# Patient Record
Sex: MALE | Race: WHITE | Employment: FULL TIME | URBAN - METROPOLITAN AREA
[De-identification: names, ages, dates, MRNs, and addresses within clinical notes are randomized per-mention and may not be internally consistent; named-entity substitution may affect disease eponyms.]

---

## 2017-02-06 ENCOUNTER — OFFICE VISIT (OUTPATIENT)
Dept: FAMILY MEDICINE CLINIC | Facility: CLINIC | Age: 32
End: 2017-02-06

## 2017-02-06 VITALS
SYSTOLIC BLOOD PRESSURE: 130 MMHG | DIASTOLIC BLOOD PRESSURE: 82 MMHG | OXYGEN SATURATION: 98 % | HEART RATE: 52 BPM | BODY MASS INDEX: 31 KG/M2 | TEMPERATURE: 98 F | WEIGHT: 222 LBS

## 2017-02-06 DIAGNOSIS — R42 EPISODE OF DIZZINESS: ICD-10-CM

## 2017-02-06 DIAGNOSIS — R00.1 BRADYCARDIA: Primary | ICD-10-CM

## 2017-02-06 DIAGNOSIS — H92.02 EAR PAIN, LEFT: ICD-10-CM

## 2017-02-06 NOTE — PROGRESS NOTES
CHIEF COMPLAINT:   Patient presents with:  Ear Pain      HPI:   Cristy Anne is a 33year old male who presents to clinic today with complaints of left ear pain. Has had for 2  days.  Pain is described as achy and is located at left ear and going down landmarks visible. Left TM: with mild erythema around TM with opaque TM. Small piece of cerumen near TM.   NOSE: nostrils patent, no exudates, nasal mucosa pink and noninflamed  THROAT: oral mucosa pink, moist. Posterior pharynx is not erythematous or inje

## 2017-02-07 ENCOUNTER — HOSPITAL ENCOUNTER (OUTPATIENT)
Age: 32
Discharge: HOME | End: 2017-02-07
Attending: EMERGENCY MEDICINE
Payer: COMMERCIAL

## 2017-02-07 VITALS
BODY MASS INDEX: 30.94 KG/M2 | HEIGHT: 71 IN | RESPIRATION RATE: 16 BRPM | TEMPERATURE: 98 F | SYSTOLIC BLOOD PRESSURE: 135 MMHG | DIASTOLIC BLOOD PRESSURE: 84 MMHG | HEART RATE: 60 BPM | OXYGEN SATURATION: 97 % | WEIGHT: 221 LBS

## 2017-02-07 DIAGNOSIS — H66.002 ACUTE SUPPURATIVE OTITIS MEDIA OF LEFT EAR WITHOUT SPONTANEOUS RUPTURE OF TYMPANIC MEMBRANE, RECURRENCE NOT SPECIFIED: Primary | ICD-10-CM

## 2017-02-07 PROCEDURE — 99204 OFFICE O/P NEW MOD 45 MIN: CPT

## 2017-02-07 PROCEDURE — 99213 OFFICE O/P EST LOW 20 MIN: CPT

## 2017-02-07 RX ORDER — AMOXICILLIN 500 MG/1
500 TABLET, FILM COATED ORAL 3 TIMES DAILY
Qty: 30 TABLET | Refills: 0 | Status: SHIPPED | OUTPATIENT
Start: 2017-02-07 | End: 2017-02-17

## 2017-02-07 NOTE — ED INITIAL ASSESSMENT (HPI)
Pt states that he has had an left sided earache for the past 3 days. Pt saw Earlis Public yesterday and told that it was a virus but today his ear feels worse. Pt also c/o intermittent dizziness.

## 2017-02-07 NOTE — ED PROVIDER NOTES
Patient presents with:  Ear Problem Pain (neurosensory)      HPI:     Jh Alexander is a 33year old male who presents with a chief complaint of left ear pain. Onset of symptoms was several days ago.   Symptoms reported as pain and dizziness and are rep appear edematous there is a small amount of cerumen present in the ear canal which does not appear purulent.   The left tympanic membrane is hyperemic along the superior and posterior border and has a diminished light reflex compared to the right there is n

## 2017-02-09 ENCOUNTER — OFFICE VISIT (OUTPATIENT)
Dept: AUDIOLOGY | Facility: CLINIC | Age: 32
End: 2017-02-09

## 2017-02-09 ENCOUNTER — OFFICE VISIT (OUTPATIENT)
Dept: OTOLARYNGOLOGY | Facility: CLINIC | Age: 32
End: 2017-02-09

## 2017-02-09 VITALS
TEMPERATURE: 98 F | HEART RATE: 60 BPM | DIASTOLIC BLOOD PRESSURE: 62 MMHG | RESPIRATION RATE: 18 BRPM | SYSTOLIC BLOOD PRESSURE: 110 MMHG

## 2017-02-09 DIAGNOSIS — H92.03 EAR PAIN, BILATERAL: ICD-10-CM

## 2017-02-09 DIAGNOSIS — H92.02 OTALGIA, LEFT EAR: Primary | ICD-10-CM

## 2017-02-09 DIAGNOSIS — R42 DIZZINESS: Primary | ICD-10-CM

## 2017-02-09 PROCEDURE — 92557 COMPREHENSIVE HEARING TEST: CPT | Performed by: AUDIOLOGIST

## 2017-02-09 PROCEDURE — 99203 OFFICE O/P NEW LOW 30 MIN: CPT | Performed by: OTOLARYNGOLOGY

## 2017-02-09 PROCEDURE — 99212 OFFICE O/P EST SF 10 MIN: CPT | Performed by: OTOLARYNGOLOGY

## 2017-02-09 PROCEDURE — 92567 TYMPANOMETRY: CPT | Performed by: AUDIOLOGIST

## 2017-02-09 RX ORDER — FLUTICASONE PROPIONATE 50 MCG
1 SPRAY, SUSPENSION (ML) NASAL 2 TIMES DAILY
Qty: 1 BOTTLE | Refills: 3 | Status: SHIPPED | OUTPATIENT
Start: 2017-02-09 | End: 2017-09-16 | Stop reason: ALTCHOICE

## 2017-02-09 RX ORDER — MONTELUKAST SODIUM 10 MG/1
10 TABLET ORAL NIGHTLY
Qty: 30 TABLET | Refills: 3 | Status: SHIPPED | OUTPATIENT
Start: 2017-02-09 | End: 2017-09-16 | Stop reason: ALTCHOICE

## 2017-02-09 RX ORDER — AMOXICILLIN 500 MG/1
CAPSULE ORAL
COMMUNITY
Start: 2017-02-07 | End: 2017-02-09

## 2017-02-09 RX ORDER — LORATADINE 10 MG/1
10 TABLET ORAL DAILY
Qty: 30 TABLET | Refills: 3 | Status: SHIPPED | OUTPATIENT
Start: 2017-02-09 | End: 2017-09-16 | Stop reason: ALTCHOICE

## 2017-02-09 NOTE — PROGRESS NOTES
AUDIOGRAM     Ghanshyam Yun was referred for testing by Lino Lundberg for left ear pain   5/6/1985  [id number]      Otoscopic inspection: right ear no cerumen; left ear no cerumen.        Tests/Procedures  Patient was tested via  standard insert earpho

## 2017-03-01 ENCOUNTER — OFFICE VISIT (OUTPATIENT)
Dept: OTOLARYNGOLOGY | Facility: CLINIC | Age: 32
End: 2017-03-01

## 2017-03-01 VITALS
BODY MASS INDEX: 31.14 KG/M2 | SYSTOLIC BLOOD PRESSURE: 132 MMHG | TEMPERATURE: 98 F | DIASTOLIC BLOOD PRESSURE: 80 MMHG | WEIGHT: 220 LBS | HEIGHT: 70.5 IN

## 2017-03-01 DIAGNOSIS — L72.3 INFECTED SEBACEOUS CYST: Primary | ICD-10-CM

## 2017-03-01 DIAGNOSIS — L08.9 INFECTED SEBACEOUS CYST: Primary | ICD-10-CM

## 2017-03-01 PROCEDURE — 99212 OFFICE O/P EST SF 10 MIN: CPT | Performed by: OTOLARYNGOLOGY

## 2017-03-01 PROCEDURE — 99213 OFFICE O/P EST LOW 20 MIN: CPT | Performed by: OTOLARYNGOLOGY

## 2017-03-01 RX ORDER — NEOMYCIN SULFATE, POLYMYXIN B SULFATE AND HYDROCORTISONE 10; 3.5; 1 MG/ML; MG/ML; [USP'U]/ML
3 SUSPENSION/ DROPS AURICULAR (OTIC) 3 TIMES DAILY
Qty: 1 BOTTLE | Refills: 0 | Status: SHIPPED | OUTPATIENT
Start: 2017-03-01 | End: 2017-03-01

## 2017-03-01 RX ORDER — NEOMYCIN SULFATE, POLYMYXIN B SULFATE AND HYDROCORTISONE 10; 3.5; 1 MG/ML; MG/ML; [USP'U]/ML
3 SUSPENSION/ DROPS AURICULAR (OTIC) 3 TIMES DAILY
Qty: 1 BOTTLE | Refills: 0 | OUTPATIENT
Start: 2017-03-01 | End: 2017-03-08

## 2017-03-01 NOTE — PROGRESS NOTES
Jackie Gudino is a 33year old male. Patient presents with:   Follow - Up: 3 week follow up- bilateral ear pain- per pt no pain but there is bloody discharges 2 days ago  Follow - Up: dizziness- per pt no episodes of dizziness      HISTORY OF PRESENT ILL bleeding and easy bruising.      PHYSICAL EXAM    /80 mmHg  Temp(Src) 97.7 °F (36.5 °C) (Tympanic)  Ht 5' 10.5\" (1.791 m)  Wt 220 lb (99.791 kg)  BMI 31.11 kg/m2    System Findings Details   Skin Normal Inspection - Normal. No suspicious lesions brui continue to decompress the cyst should this not resolve with local measures we need definitive excision of this.       Sandra Ma MD    3/1/2017    2:14 PM

## 2017-03-01 NOTE — PATIENT INSTRUCTIONS
Epidermoid Cyst (Sebaceous Cyst), Infected (Antibiotic Treatment)  You have an epidermoid cyst. This is a small, painless lump under your skin.  An epidermoid cyst (often called a sebaceous cyst, epidermal cyst, or epidermal inclusion cyst) is a term most · You may use over-the-counter pain medicine to control pain, unless another medicine was given. If you have chronic liver or kidney disease or ever had a stomach ulcer or GI bleeding, talk with your healthcare provider before using these medicines.   Preve

## 2017-07-08 ENCOUNTER — OFFICE VISIT (OUTPATIENT)
Dept: OTOLARYNGOLOGY | Facility: CLINIC | Age: 32
End: 2017-07-08

## 2017-07-08 VITALS
BODY MASS INDEX: 29.73 KG/M2 | TEMPERATURE: 98 F | DIASTOLIC BLOOD PRESSURE: 80 MMHG | SYSTOLIC BLOOD PRESSURE: 130 MMHG | WEIGHT: 210 LBS | HEIGHT: 70.5 IN

## 2017-07-08 DIAGNOSIS — L08.9 INFECTED SEBACEOUS CYST: Primary | ICD-10-CM

## 2017-07-08 DIAGNOSIS — L72.3 INFECTED SEBACEOUS CYST: Primary | ICD-10-CM

## 2017-07-08 PROCEDURE — 99212 OFFICE O/P EST SF 10 MIN: CPT | Performed by: OTOLARYNGOLOGY

## 2017-07-08 PROCEDURE — 99213 OFFICE O/P EST LOW 20 MIN: CPT | Performed by: OTOLARYNGOLOGY

## 2017-07-08 NOTE — PROGRESS NOTES
Maureen Daughters is a 27year old male. Patient presents with:  Ear Problem: Cyst on R ear    HPI:   He had a cyst in his left ear which was lanced and is not getting in trouble since then. He has developed a cyst in his right ear as well.  It is not painfu Orientation - Oriented to time, place, person & situation. Appropriate mood and affect. Lymph Detail Normal Submental. Submandibular. Anterior cervical. Posterior cervical. Supraclavicular.    Eyes Normal Conjunctiva - Right: Normal, Left: Normal. Pupil -

## 2017-08-04 ENCOUNTER — TELEPHONE (OUTPATIENT)
Dept: OTOLARYNGOLOGY | Facility: CLINIC | Age: 32
End: 2017-08-04

## 2017-08-04 NOTE — TELEPHONE ENCOUNTER
Per pt states did not have surgery done yesterday. States cyst went away on own and Dr. Keely Caballero informed him sx was not needed. Informed to please disregard. Pt states doing well with no further questions at this time.

## 2017-09-25 PROBLEM — M43.00 PARS DEFECT WITH SPONDYLOLISTHESIS: Status: ACTIVE | Noted: 2017-09-25

## 2017-09-25 PROBLEM — Q76.49 SACRALIZATION OF LUMBAR VERTEBRA: Status: ACTIVE | Noted: 2017-09-25

## 2017-09-25 PROBLEM — M51.16 LUMBAR DISC DISEASE WITH RADICULOPATHY: Status: ACTIVE | Noted: 2017-09-25

## 2017-09-25 PROBLEM — M43.10 PARS DEFECT WITH SPONDYLOLISTHESIS: Status: ACTIVE | Noted: 2017-09-25

## 2017-11-16 ENCOUNTER — TELEPHONE (OUTPATIENT)
Dept: NEUROLOGY | Facility: CLINIC | Age: 32
End: 2017-11-16

## 2017-11-16 ENCOUNTER — OFFICE VISIT (OUTPATIENT)
Dept: NEUROLOGY | Facility: CLINIC | Age: 32
End: 2017-11-16

## 2017-11-16 VITALS
HEIGHT: 70.5 IN | WEIGHT: 214 LBS | HEART RATE: 83 BPM | DIASTOLIC BLOOD PRESSURE: 80 MMHG | SYSTOLIC BLOOD PRESSURE: 122 MMHG | BODY MASS INDEX: 30.3 KG/M2

## 2017-11-16 DIAGNOSIS — M43.16 SPONDYLOLISTHESIS OF LUMBAR REGION: ICD-10-CM

## 2017-11-16 DIAGNOSIS — M51.9 LUMBAR DISC DISEASE: ICD-10-CM

## 2017-11-16 DIAGNOSIS — M54.16 LUMBAR RADICULOPATHY: Primary | ICD-10-CM

## 2017-11-16 DIAGNOSIS — M43.00 PARS DEFECT WITH SPONDYLOLISTHESIS: ICD-10-CM

## 2017-11-16 DIAGNOSIS — M48.061 LUMBAR FORAMINAL STENOSIS: ICD-10-CM

## 2017-11-16 DIAGNOSIS — M43.10 PARS DEFECT WITH SPONDYLOLISTHESIS: ICD-10-CM

## 2017-11-16 DIAGNOSIS — M43.10 RETROLISTHESIS: ICD-10-CM

## 2017-11-16 DIAGNOSIS — Q76.49 SACRALIZATION OF LUMBAR VERTEBRA: ICD-10-CM

## 2017-11-16 PROCEDURE — 99244 OFF/OP CNSLTJ NEW/EST MOD 40: CPT | Performed by: PHYSICAL MEDICINE & REHABILITATION

## 2017-11-16 NOTE — PROGRESS NOTES
Low Back Pain H & P    Chief Complaint:  Patient presents with:  Leg Pain: Patient presents with: Patient is here for right leg pain that shoots to right lower leg, right hip and buttox. Has tried PT and has not had much relief.  Rated a 4/10      HPI:  Vasyl Alonzo degeneration Neg    • Retinal detachment Neg      \"no retina history\"       Social History     Social History  Social History   Marital status:   Spouse name: N/A    Years of education: N/A  Number of children: N/A     Occupational History  None o bilaterally. Skin:  There are no rashes or lesions.     Vitals:   11/16/17  1559   BP: 122/80   Pulse: 83       Gait:    Gait: Normal gait   Sit to Stand: no difficulty   RIGHT Walking on Toes: no difficulty   LEFT Walking on Toes: no difficulty   RIGHT straight leg raise-RIGHT Negative for pain   Sitting straight leg raise-LEFT Negative for pain   Supine straight leg raise-RIGHT Negative for pain   Supine straight leg raise-LEFT Negative for pain   Slump test-RIGHT Negative for pain   Slump test-LEFT Neg

## 2017-11-16 NOTE — TELEPHONE ENCOUNTER
Patient given verbal injection instructions, will hold fish oil. Denies NSAIDs. Will have . Advised office will call him in am after determining insurance authorization.

## 2017-11-16 NOTE — TELEPHONE ENCOUNTER
Seen by Dr Radha Omalley today, ordered TFESI injection.  Dr Radha Omalley would like to do injection tomorrow [facility] if authorization obtained in am.

## 2017-11-16 NOTE — PATIENT INSTRUCTIONS
As of October 6th 2014, the Drug Enforcement Agency [facility]) is reclassifying all hydrocodone combination medications from Schedule III to Schedule II. This includes medications such as Norco, Vicodin, Lortab, Zohydro, and Vicoprofen.     What this means for y will do a right L4 TFESI. If the injection does not help, then I will do an EMG/NCS of the right leg. He will get lumbar spine flexion and extension x-rays. The patient will continue with PT and the chiropractic care.     The patient will follow up

## 2017-11-17 NOTE — TELEPHONE ENCOUNTER
Patient has been scheduled for a right L4 TFESI  on 11/21/2017 at the [facility]. Medications and allergies reviewed. Patient informed to hold aspirins, nsaids, blood thinners, vitamins and fish oils 3-7 days prior to procedure.  Patient informed we will need comfort

## 2017-11-17 NOTE — TELEPHONE ENCOUNTER
Called [facility] [phone] for Authorization of Approval for Rt L4-5 TFESI with CPT [phone], L & B with Ref #[id number]  --  Offices are closed  [facility] [phone] for Authorization of Approval for Rt L4-5 with CPT code 92274 /18369, t/t Chava Sarkar,

## 2017-11-21 ENCOUNTER — OFFICE VISIT (OUTPATIENT)
Dept: SURGERY | Facility: CLINIC | Age: 32
End: 2017-11-21

## 2017-11-21 DIAGNOSIS — M54.16 LUMBAR RADICULOPATHY: Primary | ICD-10-CM

## 2017-11-21 DIAGNOSIS — M51.9 LUMBAR DISC DISEASE: ICD-10-CM

## 2017-11-21 DIAGNOSIS — M48.061 LUMBAR FORAMINAL STENOSIS: ICD-10-CM

## 2017-11-21 PROCEDURE — 64483 NJX AA&/STRD TFRM EPI L/S 1: CPT | Performed by: PHYSICAL MEDICINE & REHABILITATION

## 2017-11-21 NOTE — PROCEDURES
Chris BAKER 7.    LUMBAR TRANSFORAMINAL   NAME:  Marlene Nelson    MR #:    [id number] :  1985     PHYSICIAN:  Anibal Rollins        Operative Report    DATE OF PROCEDURE: 2017   PREOPERATIVE DIAGNOSES: 1. right L4 radicu fluid, or air was aspirated. Then, the patient was injected with a 4 cc solution of 2 cc of 40 mg/cc of Kenalog and 2 cc of 1% PF lidocaine without epinephrine. After this, the needle was removed. The patient's skin was cleaned. A Band-Aid was applied.

## 2019-05-23 ENCOUNTER — TELEPHONE (OUTPATIENT)
Dept: NEUROLOGY | Facility: CLINIC | Age: 34
End: 2019-05-23

## 2019-05-23 NOTE — TELEPHONE ENCOUNTER
Patient has not been evaluated since 11/16/2017. Contacted patient who states he is doing well but is having some back pain. Has follow up 7/10/19. Offered appt 5/30/19 at 1:40p. Patient confirmed appt.

## 2019-05-30 ENCOUNTER — TELEPHONE (OUTPATIENT)
Dept: NEUROLOGY | Facility: CLINIC | Age: 34
End: 2019-05-30

## 2019-05-30 ENCOUNTER — OFFICE VISIT (OUTPATIENT)
Dept: NEUROLOGY | Facility: CLINIC | Age: 34
End: 2019-05-30
Payer: COMMERCIAL

## 2019-05-30 VITALS
WEIGHT: 226 LBS | BODY MASS INDEX: 31.99 KG/M2 | SYSTOLIC BLOOD PRESSURE: 144 MMHG | DIASTOLIC BLOOD PRESSURE: 90 MMHG | HEIGHT: 70.5 IN

## 2019-05-30 DIAGNOSIS — Q76.49 SACRALIZATION OF LUMBAR VERTEBRA: ICD-10-CM

## 2019-05-30 DIAGNOSIS — M54.16 LUMBAR RADICULOPATHY: Primary | ICD-10-CM

## 2019-05-30 DIAGNOSIS — M43.00 PARS DEFECT WITH SPONDYLOLISTHESIS: ICD-10-CM

## 2019-05-30 DIAGNOSIS — M43.16 SPONDYLOLISTHESIS OF LUMBAR REGION: ICD-10-CM

## 2019-05-30 DIAGNOSIS — M43.10 PARS DEFECT WITH SPONDYLOLISTHESIS: ICD-10-CM

## 2019-05-30 DIAGNOSIS — M48.061 LUMBAR FORAMINAL STENOSIS: ICD-10-CM

## 2019-05-30 DIAGNOSIS — M51.9 LUMBAR DISC DISEASE: ICD-10-CM

## 2019-05-30 DIAGNOSIS — M43.10 RETROLISTHESIS: ICD-10-CM

## 2019-05-30 PROCEDURE — 99214 OFFICE O/P EST MOD 30 MIN: CPT | Performed by: PHYSICAL MEDICINE & REHABILITATION

## 2019-05-30 NOTE — PATIENT INSTRUCTIONS
Plan  I will perform right L4 TFESI(s). He will get back into the PT for the lumbar spine. The patient will follow up in 2-3 months, but the patient will call me 2 weeks after having the injection to let me know how the injection worked.     He will

## 2019-05-30 NOTE — PROGRESS NOTES
Low Back Pain H & P    Chief Complaint: Patient presents with:  Low Back Pain: Patient states he is having severe low back pain that radiates to the buttocks down the right leg. Takes otc meds with no relief. walking helps with pain.  Completed physical the Retinal detachment Neg         \"no retina history\"       Social History   Social History    Socioeconomic History      Marital status:       Spouse name: Not on file      Number of children: Not on file      Years of education: Not on file      Hi Asked        Self-Exams: Not Asked    Social History Narrative      The patient does not use an assistive device. .        The patient does live in a home with stairs. Review of Systems      PE:   The patient does appear in his stated age in no distress Reflexes: 2+ Right medial hamstring which are absent  Left medial hamstring which are absent  Right patellar tendon which are 1+     Radiology Imaging:  I reviewed with the patient his X-ray and MRI of the lumbar spine from 9/18/17 & 9/25/17.       Assess

## 2019-05-30 NOTE — TELEPHONE ENCOUNTER
[employer] for authorization of approval for Right L4 TFESI cpt codes W177882, [id number]. Authorization is not required. Transaction [id number]  Will  inform Nursing.

## 2019-06-11 ENCOUNTER — OFFICE VISIT (OUTPATIENT)
Dept: SURGERY | Facility: CLINIC | Age: 34
End: 2019-06-11
Payer: COMMERCIAL

## 2019-06-11 DIAGNOSIS — M54.16 LUMBAR RADICULOPATHY: Primary | ICD-10-CM

## 2019-06-11 DIAGNOSIS — Q76.49 SACRALIZATION OF LUMBAR VERTEBRA: ICD-10-CM

## 2019-06-11 DIAGNOSIS — M48.061 LUMBAR FORAMINAL STENOSIS: ICD-10-CM

## 2019-06-11 DIAGNOSIS — M51.9 LUMBAR DISC DISEASE: ICD-10-CM

## 2019-06-11 PROCEDURE — 64483 NJX AA&/STRD TFRM EPI L/S 1: CPT | Performed by: PHYSICAL MEDICINE & REHABILITATION

## 2019-06-11 NOTE — PROCEDURES
Chris BAKER 7.    LUMBAR TRANSFORAMINAL   NAME:  Ghanshyam Yun    MR #:    [id number] :  1985     PHYSICIAN:  Andrews Rollins        Operative Report    DATE OF PROCEDURE: 2019   PREOPERATIVE DIAGNOSES: 1. right L4 & L5 ra indicating correct needle placement. Then, aspiration was performed. No blood, fluid, or air was aspirated. Then, the patient was injected with a 4 cc solution of 2 cc of 40 mg/cc of Kenalog and 2 cc of 1% PF lidocaine without epinephrine.   After this,

## 2019-09-12 ENCOUNTER — OFFICE VISIT (OUTPATIENT)
Dept: NEUROLOGY | Facility: CLINIC | Age: 34
End: 2019-09-12
Payer: COMMERCIAL

## 2019-09-12 VITALS — DIASTOLIC BLOOD PRESSURE: 78 MMHG | SYSTOLIC BLOOD PRESSURE: 118 MMHG | HEART RATE: 78 BPM | RESPIRATION RATE: 18 BRPM

## 2019-09-12 DIAGNOSIS — M43.16 SPONDYLOLISTHESIS OF LUMBAR REGION: ICD-10-CM

## 2019-09-12 DIAGNOSIS — M48.061 LUMBAR FORAMINAL STENOSIS: ICD-10-CM

## 2019-09-12 DIAGNOSIS — M43.10 PARS DEFECT WITH SPONDYLOLISTHESIS: ICD-10-CM

## 2019-09-12 DIAGNOSIS — M54.16 LUMBAR RADICULOPATHY: Primary | ICD-10-CM

## 2019-09-12 DIAGNOSIS — M51.9 LUMBAR DISC DISEASE: ICD-10-CM

## 2019-09-12 DIAGNOSIS — M43.10 RETROLISTHESIS: ICD-10-CM

## 2019-09-12 DIAGNOSIS — M43.00 PARS DEFECT WITH SPONDYLOLISTHESIS: ICD-10-CM

## 2019-09-12 PROCEDURE — 99214 OFFICE O/P EST MOD 30 MIN: CPT | Performed by: PHYSICAL MEDICINE & REHABILITATION

## 2019-09-12 NOTE — PROGRESS NOTES
Low Back Pain H & P    Chief Complaint: Patient presents with:  Low Back Pain: pt here for follow up after right L4 transforaminal epidural steroid injection 6/11/19 with 80-85% relief in symptoms.  pt did not do physical therapy as recommended, felling goo drinks        Comment: ocassional      Drug use: No      Sexual activity: Not on file    Lifestyle      Physical activity:        Days per week: Not on file        Minutes per session: Not on file      Stress: Not on file    Relationships      Social merle Gait: Normal gait   Sit to Stand: no difficulty      Lumbar Spine:    Scoliosis: No scoliosis present   Lumbar Flexion: 130 degrees Painless   Lumbar Extension: 45 degrees Painless     Lumbar Spine Palpation:    Spinous Processes: Non-tender for all Spin returning symptoms and he acknowledged this. I called PT to try to get him an appointment for this prior to next week. The patient does not need any injections at this time, but would change if the pain gets worse.     The patient does not need any pain

## 2019-09-12 NOTE — PATIENT INSTRUCTIONS
Plan  He will do 1-4 sessions of the PT since he and his wife will be having a baby next week.   I spoke to him about how important the PT will be to make sure that he has full recovery and lessen the chances of returning symptoms and he acknowledged this

## 2020-02-04 ENCOUNTER — HOSPITAL (OUTPATIENT)
Dept: OTHER | Age: 35
End: 2020-02-04

## 2020-03-12 ENCOUNTER — OFFICE VISIT (OUTPATIENT)
Dept: NEUROLOGY | Facility: CLINIC | Age: 35
End: 2020-03-12
Payer: COMMERCIAL

## 2020-03-12 VITALS
WEIGHT: 220 LBS | RESPIRATION RATE: 17 BRPM | BODY MASS INDEX: 32.58 KG/M2 | DIASTOLIC BLOOD PRESSURE: 78 MMHG | HEART RATE: 80 BPM | SYSTOLIC BLOOD PRESSURE: 116 MMHG | HEIGHT: 69 IN

## 2020-03-12 DIAGNOSIS — M43.16 SPONDYLOLISTHESIS OF LUMBAR REGION: ICD-10-CM

## 2020-03-12 DIAGNOSIS — M51.9 LUMBAR DISC DISEASE: ICD-10-CM

## 2020-03-12 DIAGNOSIS — M48.061 LUMBAR FORAMINAL STENOSIS: ICD-10-CM

## 2020-03-12 DIAGNOSIS — M43.10 PARS DEFECT WITH SPONDYLOLISTHESIS: ICD-10-CM

## 2020-03-12 DIAGNOSIS — Q76.49 SACRALIZATION OF LUMBAR VERTEBRA: ICD-10-CM

## 2020-03-12 DIAGNOSIS — M43.00 PARS DEFECT WITH SPONDYLOLISTHESIS: ICD-10-CM

## 2020-03-12 DIAGNOSIS — M43.10 RETROLISTHESIS: ICD-10-CM

## 2020-03-12 DIAGNOSIS — M54.16 LUMBAR RADICULOPATHY: Primary | ICD-10-CM

## 2020-03-12 PROCEDURE — 99214 OFFICE O/P EST MOD 30 MIN: CPT | Performed by: PHYSICAL MEDICINE & REHABILITATION

## 2020-03-12 NOTE — PATIENT INSTRUCTIONS
Plan  He will get into the PT for the lumbar spine. I will hold on doing any further injections at this time. He will continue with his work outs. The patient does not need any pain medications at this time.     He will follow up in 3 months or s

## 2020-03-12 NOTE — PROGRESS NOTES
Low Back Pain H & P    Chief Complaint: Patient presents with:  Low Back Pain: LOV: 9/12/19 - Pt presents for f/u on Right LBP w/ some relief that is more mild and takes longer to \"flare up\" radiates down RLE on occasion w/ weakness after increased stand • Macular degeneration Neg    • Retinal detachment Neg         \"no retina history\"       Social History   Social History    Socioeconomic History      Marital status:       Spouse name: Not on file      Number of children: Not on file      Years Yes        Bike Helmet: Not Asked        Seat Belt: Not Asked        Self-Exams: Not Asked    Social History Narrative      The patient does not use an assistive device. .        The patient does live in a home with stairs.       Review of Systems      PE: retrolisthesis    3. L4-5 grade 1-2 unstable spondylolisthesis    4. L4-5 right large foraminal & mod diffuse, L3-4 left mod/right mild-mod far lateral, L2-3 right mild/left mod far lateral bulging discs    5.  L4-5 right severe/left mild-mod, L3-4 right mi

## 2020-05-12 ENCOUNTER — PATIENT MESSAGE (OUTPATIENT)
Dept: NEUROLOGY | Facility: CLINIC | Age: 35
End: 2020-05-12

## 2020-05-12 DIAGNOSIS — M43.16 SPONDYLOLISTHESIS OF LUMBAR REGION: ICD-10-CM

## 2020-05-12 DIAGNOSIS — M54.16 LUMBAR RADICULOPATHY: ICD-10-CM

## 2020-05-12 DIAGNOSIS — M51.9 LUMBAR DISC DISEASE: ICD-10-CM

## 2020-05-12 DIAGNOSIS — M43.10 PARS DEFECT WITH SPONDYLOLISTHESIS: Primary | ICD-10-CM

## 2020-05-12 DIAGNOSIS — M43.10 RETROLISTHESIS: ICD-10-CM

## 2020-05-12 DIAGNOSIS — M48.061 LUMBAR FORAMINAL STENOSIS: ICD-10-CM

## 2020-05-12 DIAGNOSIS — M43.00 PARS DEFECT WITH SPONDYLOLISTHESIS: Primary | ICD-10-CM

## 2020-05-12 DIAGNOSIS — Q76.49 SACRALIZATION OF LUMBAR VERTEBRA: ICD-10-CM

## 2020-05-13 ENCOUNTER — TELEPHONE (OUTPATIENT)
Dept: NEUROLOGY | Facility: CLINIC | Age: 35
End: 2020-05-13

## 2020-05-13 NOTE — TELEPHONE ENCOUNTER
He might need to have a right L4 TFESI if the pain is only right sided.   I will place the order for the PT.

## 2020-05-13 NOTE — TELEPHONE ENCOUNTER
Patient w scheduled for right L4 TFESI on Friday, May 29, 2020. Medications and allergies reviewed. Patient informed to hold aspirins, nsaids, blood thinners, vitamins and fish oils 7 days prior to procedure. PatPatient informed he will need a .  Dakota Wilkinson

## 2020-05-13 NOTE — TELEPHONE ENCOUNTER
[employer] for authorization of approval for Right L4 TFESI cpt codes [id number], [id number]. Authorization is not required. Transaction ID: [id number]. Procedure may be scheduled after 06/15/20(secondary to Covid-19). Will  Inform Nursing.   Dr. Rubio Bentley please a

## 2020-05-13 NOTE — TELEPHONE ENCOUNTER
From: Michael Eduardo  To: Genette Cables A. Viann Meckel, MD  Sent: 5/12/2020 2:03 PM CDT  Subject: Visit Follow-up Question    Are you doing shots at this time? With the virus, I was not able to do PT, and the pain is becoming unbearable after 5 minutes of walking.

## 2020-05-15 ENCOUNTER — TELEPHONE (OUTPATIENT)
Dept: PHYSICAL THERAPY | Facility: HOSPITAL | Age: 35
End: 2020-05-15

## 2020-05-15 DIAGNOSIS — Z01.812 PRE-PROCEDURE LAB EXAM: Primary | ICD-10-CM

## 2020-05-15 NOTE — TELEPHONE ENCOUNTER
Left message for the patient to schedule Physical Therapy after his injection per chart review. Injection is scheduled for 5/29/20. Asked pt to call [phone] to schedule with a Neuroscience PT per Dr. Anu Del Cid.

## 2020-05-26 ENCOUNTER — LAB ENCOUNTER (OUTPATIENT)
Dept: LAB | Facility: HOSPITAL | Age: 35
End: 2020-05-26
Attending: EMERGENCY MEDICINE
Payer: COMMERCIAL

## 2020-05-26 DIAGNOSIS — Z01.812 PRE-PROCEDURE LAB EXAM: ICD-10-CM

## 2020-05-29 ENCOUNTER — OFFICE VISIT (OUTPATIENT)
Dept: SURGERY | Facility: CLINIC | Age: 35
End: 2020-05-29

## 2020-05-29 DIAGNOSIS — M48.061 LUMBAR FORAMINAL STENOSIS: ICD-10-CM

## 2020-05-29 DIAGNOSIS — M54.16 LUMBAR RADICULOPATHY: Primary | ICD-10-CM

## 2020-05-29 DIAGNOSIS — Q76.49 SACRALIZATION OF LUMBAR VERTEBRA: ICD-10-CM

## 2020-05-29 DIAGNOSIS — M51.9 LUMBAR DISC DISEASE: ICD-10-CM

## 2020-05-29 PROCEDURE — 64483 NJX AA&/STRD TFRM EPI L/S 1: CPT | Performed by: PHYSICAL MEDICINE & REHABILITATION

## 2020-05-29 NOTE — PROCEDURES
Chris BAKER 7.    LUMBAR TRANSFORAMINAL   NAME:  Caitlyn Barahona    MR #:    [id number] :  1985     PHYSICIAN:  Yue Rollins        Operative Report    DATE OF PROCEDURE: 2020   PREOPERATIVE DIAGNOSES: 1. right L4 and L5 indicating correct needle placement. Then, aspiration was performed. No blood, fluid, or air was aspirated. Then, the patient was injected with a 4 cc solution of 2 cc of 40 mg/cc of Kenalog and 2 cc of 1% PF lidocaine without epinephrine.   After this,

## 2020-06-18 ENCOUNTER — TELEPHONE (OUTPATIENT)
Dept: NEUROLOGY | Facility: CLINIC | Age: 35
End: 2020-06-18

## 2020-06-18 NOTE — TELEPHONE ENCOUNTER
[employer] for authorization of approval for Right L4 & Right L5 TFESI  cpt codes [id number], [id number], [id number]. Authorization is not required. Transaction ID: [id number] Will inform Nursing. Dr. Vic Carter please advise on provider scheduling. Singh Warren

## 2020-06-18 NOTE — TELEPHONE ENCOUNTER
Patient has been scheduled for a right L4 and L5 TFESIs  on 6/26/20 at the [place]. Medications and allergies reviewed. Patient informed to hold aspirins, nsaids, blood thinners, multivitamins, vitamin E and fish oils 3-7 days prior to procedure.  Patient info

## 2020-06-24 ENCOUNTER — LAB REQUISITION (OUTPATIENT)
Dept: LAB | Facility: HOSPITAL | Age: 35
End: 2020-06-24
Payer: COMMERCIAL

## 2020-06-24 DIAGNOSIS — Z20.828 CONTACT WITH AND (SUSPECTED) EXPOSURE TO OTHER VIRAL COMMUNICABLE DISEASES: ICD-10-CM

## 2020-06-26 ENCOUNTER — OFFICE VISIT (OUTPATIENT)
Dept: SURGERY | Facility: CLINIC | Age: 35
End: 2020-06-26

## 2020-06-26 DIAGNOSIS — M54.16 LUMBAR RADICULOPATHY: Primary | ICD-10-CM

## 2020-06-26 DIAGNOSIS — M51.9 LUMBAR DISC DISEASE: ICD-10-CM

## 2020-06-26 PROCEDURE — 64483 NJX AA&/STRD TFRM EPI L/S 1: CPT | Performed by: PHYSICAL MEDICINE & REHABILITATION

## 2020-06-26 PROCEDURE — 64484 NJX AA&/STRD TFRM EPI L/S EA: CPT | Performed by: PHYSICAL MEDICINE & REHABILITATION

## 2020-06-26 NOTE — PROCEDURES
Chris BAKER 7.    2-LEVEL LUMBAR TRANSFORAMINAL   NAME:  Maureen Branham    MR #:    [id number] :  1985     PHYSICIAN:  Shana Rollins        Operative Report    DATE OF PROCEDURE: 2020   PREOPERATIVE DIAGNOSES: 1. right L4 Then, the patient was injected with a 3 cc solution of 1.5 cc of 40 mg/cc of Kenalog and 1.5 cc of 1% PF lidocaine without epinephrine at each site. After this, the needles were removed. Each site was cleaned. Band-Aids were applied.   The patient was tr

## 2020-07-27 ENCOUNTER — APPOINTMENT (OUTPATIENT)
Dept: PHYSICAL THERAPY | Age: 35
End: 2020-07-27
Attending: PHYSICAL MEDICINE & REHABILITATION
Payer: COMMERCIAL

## 2020-07-29 ENCOUNTER — OFFICE VISIT (OUTPATIENT)
Dept: PHYSICAL THERAPY | Age: 35
End: 2020-07-29
Attending: PHYSICAL MEDICINE & REHABILITATION
Payer: COMMERCIAL

## 2020-07-29 DIAGNOSIS — M43.10 PARS DEFECT WITH SPONDYLOLISTHESIS: ICD-10-CM

## 2020-07-29 DIAGNOSIS — M43.00 PARS DEFECT WITH SPONDYLOLISTHESIS: ICD-10-CM

## 2020-07-29 DIAGNOSIS — Q76.49 SACRALIZATION OF LUMBAR VERTEBRA: ICD-10-CM

## 2020-07-29 DIAGNOSIS — M43.16 SPONDYLOLISTHESIS OF LUMBAR REGION: ICD-10-CM

## 2020-07-29 DIAGNOSIS — M54.16 LUMBAR RADICULOPATHY: ICD-10-CM

## 2020-07-29 DIAGNOSIS — M43.10 RETROLISTHESIS: ICD-10-CM

## 2020-07-29 DIAGNOSIS — M51.9 LUMBAR DISC DISEASE: ICD-10-CM

## 2020-07-29 DIAGNOSIS — M48.061 LUMBAR FORAMINAL STENOSIS: ICD-10-CM

## 2020-07-29 PROCEDURE — 97162 PT EVAL MOD COMPLEX 30 MIN: CPT

## 2020-07-29 PROCEDURE — 97110 THERAPEUTIC EXERCISES: CPT

## 2020-07-29 NOTE — PROGRESS NOTES
LUMBAR SPINE EVALUATION:   Referring Physician: Dr. 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 recommended PT. He was doing ok at the time of the visit, but then after that the pain really started getting bad and every day gets worse and worse. He also saw a spine surgeon who right away recommended spinal fusion.  Patient would like to try therapy f flexibility. Functional deficits include but are not limited to prolonged sitting, standing and walking, sleep. Pt and PT discussed evaluation findings, pathology, POC and HEP. Pt voiced understanding and performs HEP correctly without reported pain.  Sk importance of remaining active and instruction on initial HEP.   Today's Treatment: prone: MFR for L on R sacral torsion, lumbar mobilizations to facilitate sideglide R>L, supine TrA activation, supine pelvic clock 12<>6 and 3<>9, sustained hold of pelvic c Potential:good  FOTO: not issued per covid    Patient was advised of these findings, precautions, and treatment options and has agreed to actively participate in planning and for this course of care.     Thank you for your referral. Please co-sign or sign a

## 2020-08-03 ENCOUNTER — TELEPHONE (OUTPATIENT)
Dept: PHYSICAL THERAPY | Age: 35
End: 2020-08-03

## 2020-08-03 ENCOUNTER — APPOINTMENT (OUTPATIENT)
Dept: PHYSICAL THERAPY | Age: 35
End: 2020-08-03
Attending: PHYSICAL MEDICINE & REHABILITATION
Payer: COMMERCIAL

## 2020-08-05 ENCOUNTER — OFFICE VISIT (OUTPATIENT)
Dept: PHYSICAL THERAPY | Age: 35
End: 2020-08-05
Attending: PHYSICAL MEDICINE & REHABILITATION
Payer: COMMERCIAL

## 2020-08-05 PROCEDURE — 97530 THERAPEUTIC ACTIVITIES: CPT

## 2020-08-05 PROCEDURE — 97110 THERAPEUTIC EXERCISES: CPT

## 2020-08-05 NOTE — PROGRESS NOTES
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 Pt will demo proper mechanics for bending/lifting to decrease risk for re-injury - in progress  5. Pt will report centralization of symptoms  - in progress     Date:8/4/20  Tx #: 2 Date:   Tx#: Date:

## 2020-08-10 ENCOUNTER — TELEPHONE (OUTPATIENT)
Dept: PHYSICAL THERAPY | Age: 35
End: 2020-08-10

## 2020-08-10 ENCOUNTER — APPOINTMENT (OUTPATIENT)
Dept: PHYSICAL THERAPY | Age: 35
End: 2020-08-10
Attending: PHYSICAL MEDICINE & REHABILITATION
Payer: COMMERCIAL

## 2020-08-12 ENCOUNTER — OFFICE VISIT (OUTPATIENT)
Dept: PHYSICAL THERAPY | Age: 35
End: 2020-08-12
Attending: PHYSICAL MEDICINE & REHABILITATION
Payer: COMMERCIAL

## 2020-08-12 PROCEDURE — 97110 THERAPEUTIC EXERCISES: CPT

## 2020-08-12 NOTE — PATIENT INSTRUCTIONS
You can perform hip stretches either laying down or sitting, or alternate between the two. Additional Progressions:  1. Yellow band for clamshells (laying on your side with your hips and knees bent)  2.  Blue band for supine hip abduction (laying on

## 2020-08-12 NOTE — PROGRESS NOTES
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

## 2020-08-19 ENCOUNTER — APPOINTMENT (OUTPATIENT)
Dept: PHYSICAL THERAPY | Age: 35
End: 2020-08-19
Attending: PHYSICAL MEDICINE & REHABILITATION
Payer: COMMERCIAL

## 2020-08-21 ENCOUNTER — APPOINTMENT (OUTPATIENT)
Dept: PHYSICAL THERAPY | Age: 35
End: 2020-08-21
Attending: PHYSICAL MEDICINE & REHABILITATION
Payer: COMMERCIAL

## 2020-08-26 ENCOUNTER — APPOINTMENT (OUTPATIENT)
Dept: PHYSICAL THERAPY | Age: 35
End: 2020-08-26
Attending: PHYSICAL MEDICINE & REHABILITATION
Payer: COMMERCIAL

## 2020-09-02 ENCOUNTER — APPOINTMENT (OUTPATIENT)
Dept: PHYSICAL THERAPY | Age: 35
End: 2020-09-02
Attending: PHYSICAL MEDICINE & REHABILITATION
Payer: COMMERCIAL

## 2020-09-09 ENCOUNTER — APPOINTMENT (OUTPATIENT)
Dept: PHYSICAL THERAPY | Age: 35
End: 2020-09-09
Attending: PHYSICAL MEDICINE & REHABILITATION
Payer: COMMERCIAL

## 2020-11-18 ENCOUNTER — APPOINTMENT (OUTPATIENT)
Dept: LAB | Facility: HOSPITAL | Age: 35
End: 2020-11-18
Attending: ORTHOPAEDIC SURGERY
Payer: COMMERCIAL

## 2020-11-18 DIAGNOSIS — Z01.818 PREOP TESTING: ICD-10-CM

## 2020-11-18 PROCEDURE — 86900 BLOOD TYPING SEROLOGIC ABO: CPT

## 2020-11-18 PROCEDURE — 86850 RBC ANTIBODY SCREEN: CPT

## 2020-11-18 PROCEDURE — 86901 BLOOD TYPING SEROLOGIC RH(D): CPT

## 2020-11-18 PROCEDURE — 36415 COLL VENOUS BLD VENIPUNCTURE: CPT

## 2020-11-20 ENCOUNTER — ANESTHESIA (OUTPATIENT)
Dept: SURGERY | Facility: HOSPITAL | Age: 35
DRG: 455 | End: 2020-11-20
Payer: COMMERCIAL

## 2020-11-20 ENCOUNTER — ANESTHESIA EVENT (OUTPATIENT)
Dept: SURGERY | Facility: HOSPITAL | Age: 35
DRG: 455 | End: 2020-11-20
Payer: COMMERCIAL

## 2020-11-20 ENCOUNTER — HOSPITAL ENCOUNTER (INPATIENT)
Facility: HOSPITAL | Age: 35
LOS: 1 days | Discharge: HOME | DRG: 455 | End: 2020-11-21
Attending: ORTHOPAEDIC SURGERY | Admitting: ORTHOPAEDIC SURGERY
Payer: COMMERCIAL

## 2020-11-20 ENCOUNTER — APPOINTMENT (OUTPATIENT)
Dept: GENERAL RADIOLOGY | Facility: HOSPITAL | Age: 35
DRG: 455 | End: 2020-11-20
Attending: ORTHOPAEDIC SURGERY
Payer: COMMERCIAL

## 2020-11-20 DIAGNOSIS — M48.062 SPINAL STENOSIS OF LUMBAR REGION WITH NEUROGENIC CLAUDICATION: ICD-10-CM

## 2020-11-20 DIAGNOSIS — M43.06 SPONDYLOLYSIS OF LUMBAR REGION: ICD-10-CM

## 2020-11-20 DIAGNOSIS — Q76.2 CONGENITAL SPONDYLOLISTHESIS OF LUMBAR REGION: ICD-10-CM

## 2020-11-20 DIAGNOSIS — Z01.818 PREOP TESTING: Primary | ICD-10-CM

## 2020-11-20 PROBLEM — M48.061 LUMBAR SPINAL STENOSIS: Status: ACTIVE | Noted: 2020-11-20

## 2020-11-20 PROCEDURE — 95938 SOMATOSENSORY TESTING: CPT | Performed by: ORTHOPAEDIC SURGERY

## 2020-11-20 PROCEDURE — 97530 THERAPEUTIC ACTIVITIES: CPT

## 2020-11-20 PROCEDURE — 4A11X4G MONITORING OF PERIPHERAL NERVOUS ELECTRICAL ACTIVITY, INTRAOPERATIVE, EXTERNAL APPROACH: ICD-10-PCS | Performed by: ORTHOPAEDIC SURGERY

## 2020-11-20 PROCEDURE — 07DS0ZZ EXTRACTION OF VERTEBRAL BONE MARROW, OPEN APPROACH: ICD-10-PCS | Performed by: ORTHOPAEDIC SURGERY

## 2020-11-20 PROCEDURE — 01NB0ZZ RELEASE LUMBAR NERVE, OPEN APPROACH: ICD-10-PCS | Performed by: ORTHOPAEDIC SURGERY

## 2020-11-20 PROCEDURE — 76000 FLUOROSCOPY <1 HR PHYS/QHP: CPT | Performed by: ORTHOPAEDIC SURGERY

## 2020-11-20 PROCEDURE — 0SG0071 FUSION OF LUMBAR VERTEBRAL JOINT WITH AUTOLOGOUS TISSUE SUBSTITUTE, POSTERIOR APPROACH, POSTERIOR COLUMN, OPEN APPROACH: ICD-10-PCS | Performed by: ORTHOPAEDIC SURGERY

## 2020-11-20 PROCEDURE — 3E0T3BZ INTRODUCTION OF ANESTHETIC AGENT INTO PERIPHERAL NERVES AND PLEXI, PERCUTANEOUS APPROACH: ICD-10-PCS | Performed by: ORTHOPAEDIC SURGERY

## 2020-11-20 PROCEDURE — 95860 NEEDLE EMG 1 EXTREMITY: CPT | Performed by: ORTHOPAEDIC SURGERY

## 2020-11-20 PROCEDURE — 0SG00AJ FUSION OF LUMBAR VERTEBRAL JOINT WITH INTERBODY FUSION DEVICE, POSTERIOR APPROACH, ANTERIOR COLUMN, OPEN APPROACH: ICD-10-PCS | Performed by: ORTHOPAEDIC SURGERY

## 2020-11-20 PROCEDURE — 97161 PT EVAL LOW COMPLEX 20 MIN: CPT

## 2020-11-20 DEVICE — 9.8-ASTRATICANNULATEDPOLY: Type: IMPLANTABLE DEVICE | Site: BACK | Status: FUNCTIONAL

## 2020-11-20 DEVICE — 9.1-6MMTIMISFIXEDLORDOSE: Type: IMPLANTABLE DEVICE | Site: BACK | Status: FUNCTIONAL

## 2020-11-20 DEVICE — SCREW ST T30  SPIN ASTRA: Type: IMPLANTABLE DEVICE | Site: BACK | Status: FUNCTIONAL

## 2020-11-20 DEVICE — 8.11-ORIOTRIOSTEOTRAPEZOIDAL: Type: IMPLANTABLE DEVICE | Site: BACK | Status: FUNCTIONAL

## 2020-11-20 RX ORDER — PROCHLORPERAZINE EDISYLATE 5 MG/ML
10 INJECTION INTRAMUSCULAR; INTRAVENOUS EVERY 6 HOURS PRN
Status: DISCONTINUED | OUTPATIENT
Start: 2020-11-20 | End: 2020-11-21

## 2020-11-20 RX ORDER — TIZANIDINE 4 MG/1
4 TABLET ORAL 3 TIMES DAILY PRN
Status: DISCONTINUED | OUTPATIENT
Start: 2020-11-20 | End: 2020-11-21

## 2020-11-20 RX ORDER — ONDANSETRON 2 MG/ML
4 INJECTION INTRAMUSCULAR; INTRAVENOUS ONCE AS NEEDED
Status: DISCONTINUED | OUTPATIENT
Start: 2020-11-20 | End: 2020-11-20 | Stop reason: HOSPADM

## 2020-11-20 RX ORDER — VANCOMYCIN HYDROCHLORIDE 1 G/20ML
INJECTION, POWDER, LYOPHILIZED, FOR SOLUTION INTRAVENOUS AS NEEDED
Status: DISCONTINUED | OUTPATIENT
Start: 2020-11-20 | End: 2020-11-20 | Stop reason: HOSPADM

## 2020-11-20 RX ORDER — MORPHINE SULFATE 4 MG/ML
2 INJECTION, SOLUTION INTRAMUSCULAR; INTRAVENOUS EVERY 10 MIN PRN
Status: DISCONTINUED | OUTPATIENT
Start: 2020-11-20 | End: 2020-11-20 | Stop reason: HOSPADM

## 2020-11-20 RX ORDER — BUPIVACAINE HYDROCHLORIDE 2.5 MG/ML
INJECTION, SOLUTION EPIDURAL; INFILTRATION; INTRACAUDAL AS NEEDED
Status: DISCONTINUED | OUTPATIENT
Start: 2020-11-20 | End: 2020-11-20 | Stop reason: HOSPADM

## 2020-11-20 RX ORDER — ASCORBIC ACID 500 MG
1000 TABLET ORAL 2 TIMES DAILY WITH MEALS
Status: DISCONTINUED | OUTPATIENT
Start: 2020-11-20 | End: 2020-11-21

## 2020-11-20 RX ORDER — METOCLOPRAMIDE 10 MG/1
10 TABLET ORAL ONCE
Status: COMPLETED | OUTPATIENT
Start: 2020-11-20 | End: 2020-11-20

## 2020-11-20 RX ORDER — DIPHENHYDRAMINE HCL 25 MG
25 CAPSULE ORAL EVERY 4 HOURS PRN
Status: DISCONTINUED | OUTPATIENT
Start: 2020-11-20 | End: 2020-11-21

## 2020-11-20 RX ORDER — MAGNESIUM CARB/ALUMINUM HYDROX 105-160MG
296 TABLET,CHEWABLE ORAL ONCE AS NEEDED
Status: ACTIVE | OUTPATIENT
Start: 2020-11-20 | End: 2020-11-20

## 2020-11-20 RX ORDER — ONDANSETRON 2 MG/ML
4 INJECTION INTRAMUSCULAR; INTRAVENOUS EVERY 4 HOURS PRN
Status: DISCONTINUED | OUTPATIENT
Start: 2020-11-20 | End: 2020-11-21

## 2020-11-20 RX ORDER — ONDANSETRON 2 MG/ML
INJECTION INTRAMUSCULAR; INTRAVENOUS AS NEEDED
Status: DISCONTINUED | OUTPATIENT
Start: 2020-11-20 | End: 2020-11-20 | Stop reason: SURG

## 2020-11-20 RX ORDER — SODIUM PHOSPHATE, DIBASIC AND SODIUM PHOSPHATE, MONOBASIC 7; 19 G/133ML; G/133ML
1 ENEMA RECTAL ONCE AS NEEDED
Status: DISCONTINUED | OUTPATIENT
Start: 2020-11-20 | End: 2020-11-21

## 2020-11-20 RX ORDER — HYDROCODONE BITARTRATE AND ACETAMINOPHEN 10; 325 MG/1; MG/1
2 TABLET ORAL EVERY 4 HOURS PRN
Status: DISCONTINUED | OUTPATIENT
Start: 2020-11-20 | End: 2020-11-21

## 2020-11-20 RX ORDER — CALCIUM CARBONATE 200(500)MG
500 TABLET,CHEWABLE ORAL 2 TIMES DAILY WITH MEALS
Status: DISCONTINUED | OUTPATIENT
Start: 2020-11-20 | End: 2020-11-21

## 2020-11-20 RX ORDER — CEFAZOLIN SODIUM/WATER 2 G/20 ML
2 SYRINGE (ML) INTRAVENOUS EVERY 8 HOURS
Status: COMPLETED | OUTPATIENT
Start: 2020-11-20 | End: 2020-11-21

## 2020-11-20 RX ORDER — HYDROMORPHONE HYDROCHLORIDE 1 MG/ML
0.4 INJECTION, SOLUTION INTRAMUSCULAR; INTRAVENOUS; SUBCUTANEOUS EVERY 5 MIN PRN
Status: DISCONTINUED | OUTPATIENT
Start: 2020-11-20 | End: 2020-11-20 | Stop reason: HOSPADM

## 2020-11-20 RX ORDER — HALOPERIDOL 5 MG/ML
0.25 INJECTION INTRAMUSCULAR ONCE AS NEEDED
Status: DISCONTINUED | OUTPATIENT
Start: 2020-11-20 | End: 2020-11-20 | Stop reason: HOSPADM

## 2020-11-20 RX ORDER — HYDROXYZINE HYDROCHLORIDE 25 MG/1
50 TABLET, FILM COATED ORAL EVERY 6 HOURS PRN
Status: DISCONTINUED | OUTPATIENT
Start: 2020-11-20 | End: 2020-11-21

## 2020-11-20 RX ORDER — MORPHINE SULFATE 4 MG/ML
4 INJECTION, SOLUTION INTRAMUSCULAR; INTRAVENOUS EVERY 10 MIN PRN
Status: DISCONTINUED | OUTPATIENT
Start: 2020-11-20 | End: 2020-11-20 | Stop reason: HOSPADM

## 2020-11-20 RX ORDER — NALOXONE HYDROCHLORIDE 0.4 MG/ML
0.08 INJECTION, SOLUTION INTRAMUSCULAR; INTRAVENOUS; SUBCUTANEOUS
Status: DISCONTINUED | OUTPATIENT
Start: 2020-11-20 | End: 2020-11-21

## 2020-11-20 RX ORDER — ROCURONIUM BROMIDE 10 MG/ML
INJECTION, SOLUTION INTRAVENOUS AS NEEDED
Status: DISCONTINUED | OUTPATIENT
Start: 2020-11-20 | End: 2020-11-20 | Stop reason: SURG

## 2020-11-20 RX ORDER — DIAZEPAM 5 MG/1
5 TABLET ORAL ONCE AS NEEDED
Status: DISCONTINUED | OUTPATIENT
Start: 2020-11-20 | End: 2020-11-20 | Stop reason: HOSPADM

## 2020-11-20 RX ORDER — PROCHLORPERAZINE EDISYLATE 5 MG/ML
5 INJECTION INTRAMUSCULAR; INTRAVENOUS ONCE AS NEEDED
Status: DISCONTINUED | OUTPATIENT
Start: 2020-11-20 | End: 2020-11-20 | Stop reason: HOSPADM

## 2020-11-20 RX ORDER — HYDROMORPHONE HYDROCHLORIDE 1 MG/ML
0.6 INJECTION, SOLUTION INTRAMUSCULAR; INTRAVENOUS; SUBCUTANEOUS EVERY 5 MIN PRN
Status: DISCONTINUED | OUTPATIENT
Start: 2020-11-20 | End: 2020-11-20 | Stop reason: HOSPADM

## 2020-11-20 RX ORDER — DIPHENHYDRAMINE HYDROCHLORIDE 50 MG/ML
25 INJECTION INTRAMUSCULAR; INTRAVENOUS EVERY 4 HOURS PRN
Status: DISCONTINUED | OUTPATIENT
Start: 2020-11-20 | End: 2020-11-21

## 2020-11-20 RX ORDER — MORPHINE SULFATE 10 MG/ML
6 INJECTION, SOLUTION INTRAMUSCULAR; INTRAVENOUS EVERY 10 MIN PRN
Status: DISCONTINUED | OUTPATIENT
Start: 2020-11-20 | End: 2020-11-20 | Stop reason: HOSPADM

## 2020-11-20 RX ORDER — MIDAZOLAM HYDROCHLORIDE 1 MG/ML
INJECTION INTRAMUSCULAR; INTRAVENOUS AS NEEDED
Status: DISCONTINUED | OUTPATIENT
Start: 2020-11-20 | End: 2020-11-20 | Stop reason: SURG

## 2020-11-20 RX ORDER — HYDROMORPHONE HYDROCHLORIDE 1 MG/ML
0.3 INJECTION, SOLUTION INTRAMUSCULAR; INTRAVENOUS; SUBCUTANEOUS
Status: DISCONTINUED | OUTPATIENT
Start: 2020-11-20 | End: 2020-11-21

## 2020-11-20 RX ORDER — ACETAMINOPHEN 500 MG
1000 TABLET ORAL ONCE
Status: COMPLETED | OUTPATIENT
Start: 2020-11-20 | End: 2020-11-20

## 2020-11-20 RX ORDER — SODIUM CHLORIDE, SODIUM LACTATE, POTASSIUM CHLORIDE, CALCIUM CHLORIDE 600; 310; 30; 20 MG/100ML; MG/100ML; MG/100ML; MG/100ML
INJECTION, SOLUTION INTRAVENOUS CONTINUOUS
Status: DISCONTINUED | OUTPATIENT
Start: 2020-11-20 | End: 2020-11-20 | Stop reason: ALTCHOICE

## 2020-11-20 RX ORDER — DIAZEPAM 5 MG/1
5 TABLET ORAL EVERY 6 HOURS PRN
Status: DISCONTINUED | OUTPATIENT
Start: 2020-11-20 | End: 2020-11-21

## 2020-11-20 RX ORDER — DOCUSATE SODIUM 100 MG/1
100 CAPSULE, LIQUID FILLED ORAL 2 TIMES DAILY
Status: DISCONTINUED | OUTPATIENT
Start: 2020-11-20 | End: 2020-11-21

## 2020-11-20 RX ORDER — HYDROCODONE BITARTRATE AND ACETAMINOPHEN 5; 325 MG/1; MG/1
1 TABLET ORAL AS NEEDED
Status: DISCONTINUED | OUTPATIENT
Start: 2020-11-20 | End: 2020-11-20 | Stop reason: HOSPADM

## 2020-11-20 RX ORDER — CEFAZOLIN SODIUM/WATER 2 G/20 ML
2 SYRINGE (ML) INTRAVENOUS ONCE
Status: COMPLETED | OUTPATIENT
Start: 2020-11-20 | End: 2020-11-20

## 2020-11-20 RX ORDER — SODIUM CHLORIDE, SODIUM LACTATE, POTASSIUM CHLORIDE, CALCIUM CHLORIDE 600; 310; 30; 20 MG/100ML; MG/100ML; MG/100ML; MG/100ML
INJECTION, SOLUTION INTRAVENOUS CONTINUOUS
Status: DISCONTINUED | OUTPATIENT
Start: 2020-11-20 | End: 2020-11-20 | Stop reason: HOSPADM

## 2020-11-20 RX ORDER — HYDROCODONE BITARTRATE AND ACETAMINOPHEN 5; 325 MG/1; MG/1
2 TABLET ORAL AS NEEDED
Status: DISCONTINUED | OUTPATIENT
Start: 2020-11-20 | End: 2020-11-20 | Stop reason: HOSPADM

## 2020-11-20 RX ORDER — FAMOTIDINE 20 MG/1
20 TABLET ORAL ONCE
Status: COMPLETED | OUTPATIENT
Start: 2020-11-20 | End: 2020-11-20

## 2020-11-20 RX ORDER — SODIUM CHLORIDE, SODIUM LACTATE, POTASSIUM CHLORIDE, CALCIUM CHLORIDE 600; 310; 30; 20 MG/100ML; MG/100ML; MG/100ML; MG/100ML
INJECTION, SOLUTION INTRAVENOUS CONTINUOUS
Status: DISCONTINUED | OUTPATIENT
Start: 2020-11-20 | End: 2020-11-21

## 2020-11-20 RX ORDER — NALOXONE HYDROCHLORIDE 0.4 MG/ML
80 INJECTION, SOLUTION INTRAMUSCULAR; INTRAVENOUS; SUBCUTANEOUS AS NEEDED
Status: DISCONTINUED | OUTPATIENT
Start: 2020-11-20 | End: 2020-11-20 | Stop reason: HOSPADM

## 2020-11-20 RX ORDER — SENNOSIDES 8.6 MG
17.2 TABLET ORAL NIGHTLY
Status: DISCONTINUED | OUTPATIENT
Start: 2020-11-20 | End: 2020-11-21

## 2020-11-20 RX ORDER — MORPHINE SULFATE 1 MG/ML
INJECTION, SOLUTION EPIDURAL; INTRATHECAL; INTRAVENOUS AS NEEDED
Status: DISCONTINUED | OUTPATIENT
Start: 2020-11-20 | End: 2020-11-20 | Stop reason: HOSPADM

## 2020-11-20 RX ORDER — POLYETHYLENE GLYCOL 3350 17 G/17G
17 POWDER, FOR SOLUTION ORAL DAILY
Status: DISCONTINUED | OUTPATIENT
Start: 2020-11-20 | End: 2020-11-21

## 2020-11-20 RX ORDER — DEXAMETHASONE SODIUM PHOSPHATE 10 MG/ML
10 INJECTION, SOLUTION INTRAMUSCULAR; INTRAVENOUS ONCE
Status: COMPLETED | OUTPATIENT
Start: 2020-11-21 | End: 2020-11-21

## 2020-11-20 RX ORDER — ACETAMINOPHEN 325 MG/1
650 TABLET ORAL EVERY 4 HOURS PRN
Status: DISCONTINUED | OUTPATIENT
Start: 2020-11-20 | End: 2020-11-21

## 2020-11-20 RX ORDER — DEXAMETHASONE SODIUM PHOSPHATE 4 MG/ML
VIAL (ML) INJECTION AS NEEDED
Status: DISCONTINUED | OUTPATIENT
Start: 2020-11-20 | End: 2020-11-20 | Stop reason: SURG

## 2020-11-20 RX ORDER — BISACODYL 10 MG
10 SUPPOSITORY, RECTAL RECTAL
Status: DISCONTINUED | OUTPATIENT
Start: 2020-11-20 | End: 2020-11-21

## 2020-11-20 RX ORDER — HYDROCODONE BITARTRATE AND ACETAMINOPHEN 10; 325 MG/1; MG/1
1 TABLET ORAL EVERY 4 HOURS PRN
Status: DISCONTINUED | OUTPATIENT
Start: 2020-11-20 | End: 2020-11-21

## 2020-11-20 RX ORDER — LIDOCAINE HYDROCHLORIDE 10 MG/ML
INJECTION, SOLUTION EPIDURAL; INFILTRATION; INTRACAUDAL; PERINEURAL AS NEEDED
Status: DISCONTINUED | OUTPATIENT
Start: 2020-11-20 | End: 2020-11-20 | Stop reason: SURG

## 2020-11-20 RX ORDER — HYDROMORPHONE HYDROCHLORIDE 1 MG/ML
0.2 INJECTION, SOLUTION INTRAMUSCULAR; INTRAVENOUS; SUBCUTANEOUS EVERY 5 MIN PRN
Status: DISCONTINUED | OUTPATIENT
Start: 2020-11-20 | End: 2020-11-20 | Stop reason: HOSPADM

## 2020-11-20 RX ADMIN — LIDOCAINE HYDROCHLORIDE 50 MG: 10 INJECTION, SOLUTION EPIDURAL; INFILTRATION; INTRACAUDAL; PERINEURAL at 07:36:00

## 2020-11-20 RX ADMIN — CEFAZOLIN SODIUM/WATER 2 G: 2 G/20 ML SYRINGE (ML) INTRAVENOUS at 07:56:00

## 2020-11-20 RX ADMIN — MIDAZOLAM HYDROCHLORIDE 2 MG: 1 INJECTION INTRAMUSCULAR; INTRAVENOUS at 07:31:00

## 2020-11-20 RX ADMIN — SODIUM CHLORIDE, SODIUM LACTATE, POTASSIUM CHLORIDE, CALCIUM CHLORIDE: 600; 310; 30; 20 INJECTION, SOLUTION INTRAVENOUS at 10:06:00

## 2020-11-20 RX ADMIN — ROCURONIUM BROMIDE 10 MG: 10 INJECTION, SOLUTION INTRAVENOUS at 07:36:00

## 2020-11-20 RX ADMIN — DEXAMETHASONE SODIUM PHOSPHATE 10 MG: 4 MG/ML VIAL (ML) INJECTION at 07:48:00

## 2020-11-20 RX ADMIN — ONDANSETRON 4 MG: 2 INJECTION INTRAMUSCULAR; INTRAVENOUS at 07:48:00

## 2020-11-20 NOTE — CM/SW NOTE
SW received MDO for s/p joint. Pt has been screened per chart review and during conversation with the RN. Pt has no identified needs at this time. PT/OT to eval the pt once medically able. SW/CM will remain available for support and any discharge needs.

## 2020-11-20 NOTE — PHYSICAL THERAPY NOTE
PHYSICAL THERAPY EVALUATION - INPATIENT     Room Number: 434/434-A  Evaluation Date: 11/20/2020  Type of Evaluation: Initial   Physician Order: PT Eval and Treat    Presenting Problem: Right L4-5 MIS TLIF by Dr. Brennan Arora  Reason for Therapy: Mobility Dysfun mechanics; Don/doff brace; Endurance; Energy conservation;Patient education;Gait training;Stair training;Transfer training;Balance training;Strengthening  Rehab Potential : Good  Frequency (Obs): Daily       PHYSICAL THERAPY MEDICAL/SOCIAL HISTORY     History Standing: Fair -      NEUROLOGICAL FINDINGS  Neurological Findings: None                     AM-PAC '6-Clicks' INPATIENT SHORT FORM - BASIC MOBILITY  How much difficulty does the patient currently have. ..  -   Turning over in bed (including adjusting bedcl able to ambulate 500 feet with least restrictive assist device:  at assistance level: modified independent   Goal #3   Current Status    Goal #4 Patient will negotiate 6 stairs/one curb w/ assistive device and supervision   Goal #4   Current Status    Goal

## 2020-11-20 NOTE — ANESTHESIA POSTPROCEDURE EVALUATION
Patient: Basilio Sims    Procedure Summary     Date: 11/20/20 Room / Location: [address] [place] [address] MAIN OR 09 / [address] [facility] MAIN OR    Anesthesia Start: 3466 Anesthesia Stop: 8898    Procedure: POSTERIOR LUMBAR INTERBODY FUSION - MIS TLIF 1 LEVEL (Right ) Diagnosis:       Con

## 2020-11-20 NOTE — ANESTHESIA PROCEDURE NOTES
Airway  Date/Time: 11/20/2020 7:42 AM  Urgency: elective    Airway not difficult    General Information and Staff    Patient location during procedure: OR  Anesthesiologist: Leann Garcia MD  Resident/CRNA: Simi Burrell CRNA  Performed: SHASTA Sorto

## 2020-11-20 NOTE — PLAN OF CARE
Pt received from PACU around 1500. Pt A+Ox4, VSS, saturating well on room air, denies pain. Urinary catheter in place draining clear yellow urine. Family at bedside. Will continue to monitor.      Problem: Patient Centered Care  Goal: Patient preferences ar using safe patient handling equipment as needed  - Ensure adequate protection for wounds/incisions during mobilization  - Obtain PT/OT consults as needed  - Advance activity as appropriate  - Communicate ordered activity level and limitations with patient/ the patient needs post-hospital services based on physician/LIP order or complex needs related to functional status, cognitive ability or social support system  Outcome: Progressing

## 2020-11-20 NOTE — H&P
CHIEF COMPLAINT:  Low back pain, right leg pain. SURGICAL CONSULTATION     HISTORY OF PRESENT ILLNESS:  Janene Hayes returns today. He is here for his preoperative visit. He has been cleared by Dr. Reji Quiroga.  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

## 2020-11-20 NOTE — CONSULTS
General Medicine Consult      Reason for consult: med management     Consulted by: Dr. Kenrick Pizarro    PCP: Alfred Garcia MD      History of Present Illness: Patient is a 38year old male with no PMH who presents with back pain, here for lumbar fusion surge detachment Neg         \"no retina history\"       Review of Systems  Comprehensive ROS reviewed and negative except for what's stated above. Including negative for chest pain, shortness of breath, syncope.        OBJECTIVE:  /78 (BP Location: Right ASSESSMENT / PLAN:  Patient is a 38year old male with no PMH who presents with back pain, here for lumbar fusion surgery.      Lumbar fusion / L4-L5 MIS TLIF  - oral and IV pain control wean to oral meds as able  - Adv diet, zofran for nausea, OBR

## 2020-11-20 NOTE — ANESTHESIA PREPROCEDURE EVALUATION
Anesthesia PreOp Note    HPI:     Francia Ayers is a 38year old male who presents for preoperative consultation requested by:  Leonora Jung MD    Date of Surgery: 11/20/2020    Procedure(s):  POSTERIOR LUMBAR INTERBODY FUSION - MIS TLIF 1 LEVEL  Anaya Carb-Cholecalciferol (CALCIUM 600 + D) 600-200 MG-UNIT Oral Tab, Take 1 tablet by mouth 2 (two) times daily. , Disp: 60 tablet, Rfl: 0, 11/19/2020 at 1500    •  tiZANidine HCl 2 MG Oral Tab, Take 1-2 tablets (2-4 mg total) by mouth every 6 (six) hours as ne member of club or organization: Not on file        Attends meetings of clubs or organizations: Not on file        Relationship status: Not on file      Intimate partner violence        Fear of current or ex partner: Not on file        Emotionally abused: N m (5' 10\")        Anesthesia Evaluation      Airway   Mallampati: II  TM distance: >3 FB  Neck ROM: full  Dental - normal exam     Pulmonary - negative ROS and normal exam   Cardiovascular - normal exam    Neuro/Psych    (+)  neuromuscular disease,

## 2020-11-20 NOTE — OPERATIVE REPORT
Pre-postop dx:  L4-5 spondylolisthesis with spondylolysis and stenosis  Proc:  Right L4-5 MIS TLIF, spinecraft cage, allograft, titanium cage  Lexis/Gonzalez MCDONALD  Ebl: 20 cc  Drains none  Complications: none  To RR stable  [id number]

## 2020-11-21 VITALS
OXYGEN SATURATION: 96 % | SYSTOLIC BLOOD PRESSURE: 143 MMHG | DIASTOLIC BLOOD PRESSURE: 76 MMHG | TEMPERATURE: 98 F | WEIGHT: 225 LBS | BODY MASS INDEX: 32.21 KG/M2 | HEART RATE: 62 BPM | RESPIRATION RATE: 18 BRPM | HEIGHT: 70 IN

## 2020-11-21 PROCEDURE — 97116 GAIT TRAINING THERAPY: CPT

## 2020-11-21 PROCEDURE — 97530 THERAPEUTIC ACTIVITIES: CPT

## 2020-11-21 PROCEDURE — 97535 SELF CARE MNGMENT TRAINING: CPT

## 2020-11-21 PROCEDURE — 97165 OT EVAL LOW COMPLEX 30 MIN: CPT

## 2020-11-21 NOTE — DISCHARGE SUMMARY
General Medicine Discharge Summary     Patient ID:  Caitlyn Barahona  38year old  5/6/1985    Admit date: 11/20/2020    Discharge date and time: 11/21/2020    Attending Physician: Gideon Shukla MD     Consults: IP CONSULT TO HOSPITALIST  IP CONSULT TO  Disposition: home    Activity: activity as tolerated  Diet: regular diet  Wound Care: as directed  Code Status: No Order  O2: none    Home Medication Changes:     Med list     Medication List      CONTINUE taking these medications    Calcium 600 + Coordinating Care: Greater than 30 minutes    Patient had opportunity to ask questions and state understand and agree with therapeutic plan as outlined    Thank Seamus Treviño M.D.  [facility] Hospitalist  Pager

## 2020-11-21 NOTE — OCCUPATIONAL THERAPY NOTE
OCCUPATIONAL THERAPY EVALUATION - INPATIENT      Room Number: 434/434-A  Evaluation Date: 11/21/2020  Type of Evaluation: Initial  Presenting Problem: (R L4-5 MIS TLIF )    Physician Order: IP Consult to Occupational Therapy  Reason for Therapy: ADL/IADL D Yes  Patient Regularly Uses: None    Stairs in Home: 6STE; 6 inside to 2nd floor   Assistive Device(s) Used: None      Prior Level of [place]: Pt lives with spouse and 2 young children, reports he was independent with ADLs, driving, and works from [employer] Transfer: n/t; discussed safe car transfer technique     Bedroom Mobility: Modified independent     FUNCTIONAL ADL ASSESSMENT  Grooming: Modified independent   Feeding: n/t   Bathing: n/t   Toileting: Modified independent (simulated as pt did not need to g

## 2020-11-21 NOTE — PLAN OF CARE
AOX4. RA. Norco X1 for pain. Up with one and walker. Voiding freely. Pt adequate for D/C. Discharge med rec completed. Pt has scripts, verbalized comfort with PO pain meds. Discharge instructions presented and reviewed. Medication side effects discussed.  Sabina Hurst factors for pressure ulcer development  - Assess and document skin integrity  - Assess and document dressing/incision, wound bed, drain sites and surrounding tissue  - Implement wound care per orders  - Initiate isolation precautions as appropriate  - Init for assistance with activity based on assessment  - Modify environment to reduce risk of injury  - Provide assistive devices as appropriate  - Consider OT/PT consult to assist with strengthening/mobility  - Encourage toileting schedule  11/21/2020 1216 by

## 2020-11-21 NOTE — CM/SW NOTE
11/21/20 1000   Discharge disposition   Expected discharge disposition Home or Self   Discharge transportation Private car     Per nursing rounds pt will dc home today with no home care needs. RN to contact DARRIUS/WAYNE if needs arise. Solange Poe.  Soko MARIXA

## 2020-11-21 NOTE — PROGRESS NOTES
S: he has controlled back pain no leg pain. He ambulated last night. He has no questions or concerns. He still has his land in. To be d/c'd at 0900    Inspection:  Awake alert No acute distress.  No difficulty breathing     Blood pressure 121/67, pulse

## 2020-11-21 NOTE — PLAN OF CARE
Pt is POD #0-1. Vital signs within normal limits. PRN Norco and Zanaflex provided for pain. Benadryl provided for itching. Alert and oriented x4. States having slight numbness to BLE. Tele #61 in place, NSR. On room air. Tolerating general diet.  Antonia in p needed  - Ensure adequate protection for wounds/incisions during mobilization  - Obtain PT/OT consults as needed  - Advance activity as appropriate  - Communicate ordered activity level and limitations with patient/family  Outcome: Progressing  Goal: Maint based on physician/LIP order or complex needs related to functional status, cognitive ability or social support system  Outcome: Progressing

## 2020-11-21 NOTE — PHYSICAL THERAPY NOTE
PHYSICAL THERAPY TREATMENT NOTE - INPATIENT     Room Number: 434/434-A       Presenting Problem: Right L4-5 MIS TLIF by Dr. See Tariq    Problem List  Active Problems:    Lumbar spinal stenosis      PHYSICAL THERAPY ASSESSMENT     Pt seen for PT treatment ses Normal  Dynamic Sitting: Normal           Static Standing: Good  Dynamic Standing: Fair -           AM-PAC '6-Clicks' INPATIENT SHORT FORM - BASIC MOBILITY  How much difficulty does the patient currently have. ..  -   Turning over in bed (including adjustin restrictive assist device:  at assistance level: modified independent   Goal #3   Current Status  Pt amb 500' x 1 with rw mod indep. Pt amb 20' x 1 without ad indep, however gait improved with use of rw.     Goal #4 Patient will negotiate 6 stairs/one curb

## 2020-11-23 NOTE — OPERATIVE REPORT
[facility]    PATIENT'S NAME: Apollo Dinero   ATTENDING PHYSICIAN: Kelsi Ryan. Zion Estrada MD   OPERATING PHYSICIAN: Kelsi Ryan.  Zion Estrada MD   PATIENT ACCOUNT#:   [id number]    LOCATION:  [facility] PACU 11 [facility] 10  MEDICAL RECORD #:   [id number]       D bleeding, infection, dural tear, paralysis, nonunion, degeneration at a level above or below, DVT, PE, and anesthetic risks. He appears to understand and has elected to proceed.     OPERATIVE TECHNIQUE:  The patient was given uncomplicated general endotrac aspirate followed by a 9 x 23 mm SpineCraft tricortical allograft block followed by a 9 x 26 mm Meirus titanium cage followed by a second 9 x 23 mm allograft block followed by additional laminectomy bone packed between the cage and the second block.   An in

## 2020-11-30 PROBLEM — Z98.1 S/P LUMBAR FUSION: Status: ACTIVE | Noted: 2020-11-30

## 2021-01-04 PROBLEM — M54.16 LUMBAR RADICULOPATHY: Status: RESOLVED | Noted: 2017-11-16 | Resolved: 2021-01-04

## 2021-01-04 PROBLEM — M51.16 LUMBAR DISC DISEASE WITH RADICULOPATHY: Status: RESOLVED | Noted: 2017-09-25 | Resolved: 2021-01-04

## 2021-01-04 PROBLEM — M48.061 LUMBAR FORAMINAL STENOSIS: Status: RESOLVED | Noted: 2017-11-16 | Resolved: 2021-01-04

## 2021-01-04 PROBLEM — M48.061 LUMBAR SPINAL STENOSIS: Status: RESOLVED | Noted: 2020-11-20 | Resolved: 2021-01-04

## 2021-01-04 PROBLEM — M43.16 SPONDYLOLISTHESIS OF LUMBAR REGION: Status: RESOLVED | Noted: 2017-11-16 | Resolved: 2021-01-04

## 2021-01-04 PROBLEM — M43.10 PARS DEFECT WITH SPONDYLOLISTHESIS: Status: RESOLVED | Noted: 2017-09-25 | Resolved: 2021-01-04

## 2021-01-04 PROBLEM — M43.00 PARS DEFECT WITH SPONDYLOLISTHESIS: Status: RESOLVED | Noted: 2017-09-25 | Resolved: 2021-01-04

## 2023-07-26 ENCOUNTER — APPOINTMENT (OUTPATIENT)
Dept: GENERAL RADIOLOGY | Facility: HOSPITAL | Age: 38
End: 2023-07-26
Attending: EMERGENCY MEDICINE
Payer: COMMERCIAL

## 2023-07-26 ENCOUNTER — HOSPITAL ENCOUNTER (EMERGENCY)
Facility: HOSPITAL | Age: 38
Discharge: HOME | End: 2023-07-26
Attending: EMERGENCY MEDICINE
Payer: COMMERCIAL

## 2023-07-26 VITALS
DIASTOLIC BLOOD PRESSURE: 89 MMHG | OXYGEN SATURATION: 98 % | HEART RATE: 61 BPM | SYSTOLIC BLOOD PRESSURE: 136 MMHG | TEMPERATURE: 99 F | RESPIRATION RATE: 24 BRPM | WEIGHT: 231.5 LBS | BODY MASS INDEX: 33 KG/M2

## 2023-07-26 DIAGNOSIS — R07.89 CHEST PAIN, ATYPICAL: Primary | ICD-10-CM

## 2023-07-26 LAB
ALBUMIN SERPL-MCNC: 4.8 G/DL (ref 3.4–5)
ALBUMIN/GLOB SERPL: 1.2 {RATIO} (ref 1–2)
ALP LIVER SERPL-CCNC: 73 U/L
ALT SERPL-CCNC: 39 U/L
ANION GAP SERPL CALC-SCNC: 7 MMOL/L (ref 0–18)
AST SERPL-CCNC: 20 U/L (ref 15–37)
ATRIAL RATE: 70 BPM
BASOPHILS # BLD AUTO: 0.04 X10(3) UL (ref 0–0.2)
BASOPHILS NFR BLD AUTO: 0.7 %
BILIRUB SERPL-MCNC: 0.6 MG/DL (ref 0.1–2)
BUN BLD-MCNC: 14 MG/DL (ref 7–18)
CALCIUM BLD-MCNC: 9.9 MG/DL (ref 8.5–10.1)
CHLORIDE SERPL-SCNC: 106 MMOL/L (ref 98–112)
CO2 SERPL-SCNC: 25 MMOL/L (ref 21–32)
CREAT BLD-MCNC: 1.14 MG/DL
EGFRCR SERPLBLD CKD-EPI 2021: 83 ML/MIN/1.73M2 (ref 60–?)
EOSINOPHIL # BLD AUTO: 0.16 X10(3) UL (ref 0–0.7)
EOSINOPHIL NFR BLD AUTO: 2.8 %
ERYTHROCYTE [DISTWIDTH] IN BLOOD BY AUTOMATED COUNT: 11.9 %
GLOBULIN PLAS-MCNC: 3.9 G/DL (ref 2.8–4.4)
GLUCOSE BLD-MCNC: 96 MG/DL (ref 70–99)
HCT VFR BLD AUTO: 48.7 %
HGB BLD-MCNC: 17 G/DL
IMM GRANULOCYTES # BLD AUTO: 0.03 X10(3) UL (ref 0–1)
IMM GRANULOCYTES NFR BLD: 0.5 %
LYMPHOCYTES # BLD AUTO: 1.59 X10(3) UL (ref 1–4)
LYMPHOCYTES NFR BLD AUTO: 27.8 %
MCH RBC QN AUTO: 30.9 PG (ref 26–34)
MCHC RBC AUTO-ENTMCNC: 34.9 G/DL (ref 31–37)
MCV RBC AUTO: 88.4 FL
MONOCYTES # BLD AUTO: 0.4 X10(3) UL (ref 0.1–1)
MONOCYTES NFR BLD AUTO: 7 %
NEUTROPHILS # BLD AUTO: 3.49 X10 (3) UL (ref 1.5–7.7)
NEUTROPHILS # BLD AUTO: 3.49 X10(3) UL (ref 1.5–7.7)
NEUTROPHILS NFR BLD AUTO: 61.2 %
OSMOLALITY SERPL CALC.SUM OF ELEC: 286 MOSM/KG (ref 275–295)
P AXIS: 17 DEGREES
P-R INTERVAL: 168 MS
PLATELET # BLD AUTO: 202 10(3)UL (ref 150–450)
POTASSIUM SERPL-SCNC: 4.1 MMOL/L (ref 3.5–5.1)
PROT SERPL-MCNC: 8.7 G/DL (ref 6.4–8.2)
Q-T INTERVAL: 378 MS
QRS DURATION: 92 MS
QTC CALCULATION (BEZET): 408 MS
R AXIS: 8 DEGREES
RBC # BLD AUTO: 5.51 X10(6)UL
SODIUM SERPL-SCNC: 138 MMOL/L (ref 136–145)
T AXIS: 35 DEGREES
TROPONIN I HIGH SENSITIVITY: <3 NG/L
VENTRICULAR RATE: 70 BPM
WBC # BLD AUTO: 5.7 X10(3) UL (ref 4–11)

## 2023-07-26 PROCEDURE — 84484 ASSAY OF TROPONIN QUANT: CPT | Performed by: EMERGENCY MEDICINE

## 2023-07-26 PROCEDURE — 36415 COLL VENOUS BLD VENIPUNCTURE: CPT

## 2023-07-26 PROCEDURE — 80053 COMPREHEN METABOLIC PANEL: CPT | Performed by: EMERGENCY MEDICINE

## 2023-07-26 PROCEDURE — 99285 EMERGENCY DEPT VISIT HI MDM: CPT

## 2023-07-26 PROCEDURE — 93005 ELECTROCARDIOGRAM TRACING: CPT

## 2023-07-26 PROCEDURE — 71045 X-RAY EXAM CHEST 1 VIEW: CPT | Performed by: EMERGENCY MEDICINE

## 2023-07-26 PROCEDURE — 85025 COMPLETE CBC W/AUTO DIFF WBC: CPT | Performed by: EMERGENCY MEDICINE

## 2023-07-26 PROCEDURE — 93010 ELECTROCARDIOGRAM REPORT: CPT

## 2023-07-26 PROCEDURE — 99284 EMERGENCY DEPT VISIT MOD MDM: CPT

## 2023-10-23 ENCOUNTER — HOSPITAL ENCOUNTER (OUTPATIENT)
Age: 38
Discharge: HOME | End: 2023-10-23
Payer: COMMERCIAL

## 2023-10-23 ENCOUNTER — APPOINTMENT (OUTPATIENT)
Dept: GENERAL RADIOLOGY | Age: 38
End: 2023-10-23
Attending: NURSE PRACTITIONER
Payer: COMMERCIAL

## 2023-10-23 VITALS
RESPIRATION RATE: 20 BRPM | TEMPERATURE: 98 F | SYSTOLIC BLOOD PRESSURE: 138 MMHG | DIASTOLIC BLOOD PRESSURE: 94 MMHG | OXYGEN SATURATION: 97 % | HEART RATE: 63 BPM

## 2023-10-23 DIAGNOSIS — J18.9 COMMUNITY ACQUIRED PNEUMONIA OF RIGHT UPPER LOBE OF LUNG: Primary | ICD-10-CM

## 2023-10-23 PROCEDURE — 99214 OFFICE O/P EST MOD 30 MIN: CPT

## 2023-10-23 PROCEDURE — 71046 X-RAY EXAM CHEST 2 VIEWS: CPT | Performed by: NURSE PRACTITIONER

## 2023-10-23 PROCEDURE — 99213 OFFICE O/P EST LOW 20 MIN: CPT

## 2023-10-23 RX ORDER — AMOXICILLIN 500 MG/1
1000 TABLET, FILM COATED ORAL 3 TIMES DAILY
Qty: 60 TABLET | Refills: 0 | Status: SHIPPED | OUTPATIENT
Start: 2023-10-23 | End: 2023-11-02

## 2023-10-23 RX ORDER — ALBUTEROL SULFATE 90 UG/1
2 AEROSOL, METERED RESPIRATORY (INHALATION) EVERY 4 HOURS PRN
Qty: 1 EACH | Refills: 0 | Status: SHIPPED | OUTPATIENT
Start: 2023-10-23 | End: 2023-11-22

## 2023-10-23 NOTE — DISCHARGE INSTRUCTIONS
Please push fluids and make sure that you are staying hydrated. Close follow-up with your primary care provider is recommended. Please take antibiotics as directed. Any shortness of breath, chest pain or worsening symptoms please immediately go to the emergency department.

## 2023-12-17 ENCOUNTER — OFFICE VISIT (OUTPATIENT)
Dept: FAMILY MEDICINE CLINIC | Facility: CLINIC | Age: 38
End: 2023-12-17
Payer: COMMERCIAL

## 2023-12-17 VITALS
RESPIRATION RATE: 16 BRPM | HEIGHT: 71 IN | DIASTOLIC BLOOD PRESSURE: 88 MMHG | BODY MASS INDEX: 33.18 KG/M2 | HEART RATE: 67 BPM | WEIGHT: 237 LBS | TEMPERATURE: 97 F | SYSTOLIC BLOOD PRESSURE: 137 MMHG | OXYGEN SATURATION: 96 %

## 2023-12-17 DIAGNOSIS — R05.8 PRODUCTIVE COUGH: ICD-10-CM

## 2023-12-17 DIAGNOSIS — J02.9 SORE THROAT: ICD-10-CM

## 2023-12-17 DIAGNOSIS — J22 LOWER RESPIRATORY TRACT INFECTION: Primary | ICD-10-CM

## 2023-12-17 LAB
CONTROL LINE PRESENT WITH A CLEAR BACKGROUND (YES/NO): YES YES/NO
KIT LOT #: NORMAL NUMERIC
OPERATOR ID: NORMAL
POCT LOT NUMBER: NORMAL
RAPID SARS-COV-2 BY PCR: NOT DETECTED
STREP GRP A CUL-SCR: NEGATIVE

## 2023-12-17 PROCEDURE — 99213 OFFICE O/P EST LOW 20 MIN: CPT | Performed by: PHYSICIAN ASSISTANT

## 2023-12-17 PROCEDURE — 3079F DIAST BP 80-89 MM HG: CPT | Performed by: PHYSICIAN ASSISTANT

## 2023-12-17 PROCEDURE — 3075F SYST BP GE 130 - 139MM HG: CPT | Performed by: PHYSICIAN ASSISTANT

## 2023-12-17 PROCEDURE — U0002 COVID-19 LAB TEST NON-CDC: HCPCS | Performed by: PHYSICIAN ASSISTANT

## 2023-12-17 PROCEDURE — 87880 STREP A ASSAY W/OPTIC: CPT | Performed by: PHYSICIAN ASSISTANT

## 2023-12-17 PROCEDURE — 3008F BODY MASS INDEX DOCD: CPT | Performed by: PHYSICIAN ASSISTANT

## 2023-12-17 RX ORDER — DOXYCYCLINE HYCLATE 100 MG/1
100 CAPSULE ORAL 2 TIMES DAILY
Qty: 10 CAPSULE | Refills: 0 | Status: SHIPPED | OUTPATIENT
Start: 2023-12-17 | End: 2023-12-22

## 2023-12-17 RX ORDER — PREDNISONE 20 MG/1
20 TABLET ORAL DAILY
Qty: 5 TABLET | Refills: 0 | Status: SHIPPED | OUTPATIENT
Start: 2023-12-17 | End: 2023-12-22

## (undated) DEVICE — SLOT PORT RET ASS 26X70

## (undated) DEVICE — SNAP KOVER: Brand: UNBRANDED

## (undated) DEVICE — OIL CARTRIDGE: Brand: CORE, MAESTRO

## (undated) DEVICE — SUTURE MONOCRYL 2-0 Y945H

## (undated) DEVICE — 1 ML INSULIN SYRINGE REGULAR LUER TIP: Brand: MONOJECT

## (undated) DEVICE — Device

## (undated) DEVICE — NEEDLE CONFIDENCE SPINAL

## (undated) DEVICE — 3 ML SYRINGE LUER-LOCK TIP: Brand: MONOJECT

## (undated) DEVICE — CONTAINER SPEC STR 4OZ GRY LID

## (undated) DEVICE — FLOSEAL HEMOSTATIC MATRIX, 5ML: Brand: FLOSEAL HEMOSTATIC MATRIX

## (undated) DEVICE — DERMABOND LIQUID ADHESIVE

## (undated) DEVICE — NEEDLE SPINAL 20X3-1/2 YELLOW

## (undated) DEVICE — ESPOCAN® 18 GA. X 3-1/2 IN. TUOHY WITH BACKEYE LUMEN, PENCAN® 27 GA. X 5 IN. PENCIL-POINT SPINAL NEEDLE: Brand: ESPOCAN®

## (undated) DEVICE — NVM5 PROBE SNGL USE STER PKG

## (undated) DEVICE — 11.1-DISPOSABLEBAYONETTESURG

## (undated) DEVICE — DRAPE SRG 90X60IN BCK TBL CVR

## (undated) DEVICE — DIFFUSER: Brand: CORE, MAESTRO

## (undated) DEVICE — LAMINECTOMY: Brand: MEDLINE INDUSTRIES, INC.

## (undated) DEVICE — SUPER SPONGES,MEDIUM: Brand: KERLIX

## (undated) DEVICE — SUTURE VICRYL 1 OS-6

## (undated) DEVICE — NON-ADHERENT PAD PREPACK: Brand: TELFA

## (undated) DEVICE — Device: Brand: JELCO

## (undated) DEVICE — GAMMEX® PI HYBRID SIZE 7, STERILE POWDER-FREE SURGICAL GLOVE, POLYISOPRENE AND NEOPRENE BLEND: Brand: GAMMEX

## (undated) DEVICE — DRAPE SHEET LG

## (undated) DEVICE — 3.0MM PRECISION NEURO (MATCH HEAD)

## (undated) DEVICE — NVM5 MULTIMODALITY SURFACE KIT

## (undated) DEVICE — ENCORE® LATEX MICRO SIZE 6.5, STERILE LATEX POWDER-FREE SURGICAL GLOVE: Brand: ENCORE

## (undated) DEVICE — C-ARMOR C-ARM EQUIPMENT COVERS CLEAR STERILE UNIVERSAL FIT 12 PER CASE: Brand: C-ARMOR

## (undated) DEVICE — MICRO KOVER: Brand: UNBRANDED

## (undated) DEVICE — SPONGE LAP 4X18 XRAY STRL

## (undated) DEVICE — TRAY FOLEY BDX 16F STATLOCK

## (undated) DEVICE — SUTURE MONOCRYL 4-0 PS-2

## (undated) DEVICE — DRAPE SRG 26X15IN UTL TPE STRL

## (undated) DEVICE — SOL  .9 1000ML BTL

## (undated) DEVICE — GAMMEX® PI HYBRID SIZE 9, STERILE POWDER-FREE SURGICAL GLOVE, POLYISOPRENE AND NEOPRENE BLEND: Brand: GAMMEX

## (undated) DEVICE — 11.1-15K-WIRENITINOLW/S

## (undated) NOTE — MR AVS SNAPSHOT
[address]  [address]  [phone]               Thank you for choosing us for your health care visit with Macky Boeck, Au.D.   We are glad to serve you and happy to provide you with this summary You can access your MyChart to more actively manage your health care and view more details from this visit by going to [email or web address].   If you've recently had a stay at the Hospital you can access your discharge instructions in [address] by [email or web address]

## (undated) NOTE — LETTER
[facility] SURGERY SCHEDULING FORM   [address] [facility]   [phone] (scheduling phone) [phone] (scheduling fax)     PATIENT INFORMATION   Last Name:      Rae Notice      First Name:    Janene Hayes Allergies: Patient has no known allergies.          Completed by:    Delores Avendaño      Date:    6/18/2020

## (undated) NOTE — Clinical Note
Dear Michelle Rodriguez,  Thank you for referring your patient to [facility]. We value our relationship with you and appreciate your confidence in our service and staff.    It is with great pleasure that we were able to provide treatment to Mine Chan

## (undated) NOTE — LETTER
[facility] SURGERY SCHEDULING FORM   [address]  [address] [facility]   [phone] (scheduling phone) [phone] (scheduling fax)     PATIENT INFORMATION   Patient Name:    Shantel Maki   :    1985 [facility] will follow its Pre-Admission Assessment and Screening Policy for pre-admission testing. Physicians that require   additional testing must order this directly and have results faxed to [address] [place] [address] at [phone].

## (undated) NOTE — MR AVS SNAPSHOT
[place]  [address]  [phone]               Thank you for choosing us for your health care visit with Stephanie Neal MD.  We are glad to serve you and happy to provide you with this summary · The area around the cyst may smell bad. If the cyst breaks open, the material inside it often smells bad as well. Your cyst became infected and your healthcare provider wanted to treat it with antibiotics.  If the antibiotics don’t clear up the infection substitute for professional medical care. Always follow your healthcare professional's instructions.              Allergies as of Mar 01, 2017     No Known Allergies                Today's Vital Signs     BP Temp Height Weight BMI    132/80 mmHg 97.7 °F (36 [email or web address]

## (undated) NOTE — MR AVS SNAPSHOT
[address] [facility]  [phone]               Thank you for choosing us for your health care visit with YOVANY Jaramillo. We are glad to serve you and happy to provide you with this summary of your visit.   Please Tips for making healthy food choices  -   Enjoy your food, but eat less. Fully enjoy your food when eating. Don’t eat while distracted and slow down. Avoid over sized portions. Don’t eat while when you’re bored.      EAT THESE FOODS MORE OFTEN: EAT T

## (undated) NOTE — LETTER
[facility] SURGERY SCHEDULING FORM   [address] [facility]   [phone] (scheduling phone) [phone] (scheduling fax)     PATIENT INFORMATION   Last Name:      Fany Montes      First Name:    Victoriano Cevallos Allergies: Patient has no known allergies.          Completed by:    Kenia Burrell      Date:    5/30/2019

## (undated) NOTE — MR AVS SNAPSHOT
[address]  [phone]               Thank you for choosing us for your health care visit with Sharath Adames.  Robin Thomson MD.  We are glad to serve you and happy to provide you with this summar - Fluticasone Propionate 50 MCG/ACT Susp  - loratadine 10 MG Tabs  - Montelukast Sodium 10 MG Tabs            Today's Orders     Audiology Referral - [facility])    Complete by:  As directed    Assoc Dx:  Dizziness [R42]                 R discharge instructions in [employer] by going to Visits < Admission Summaries. If you've been to the Emergency Department or your doctor's office, you can view your past visit information in [employer] by going to Visits < Visit Summaries. Sermo questions?

## (undated) NOTE — ED AVS SNAPSHOT
[facility] in [address] [address]    Phone:  [phone]    Fax:  [phone]           Malika Nicolás   MRN: [id number]    Department:  [facility] in [address] [place] [address]   Date of Visit: not participate in your health insurance plan. This may result in a lower benefit level being available to you or other limited reimbursement.   The physician may seek payment directly from you for amounts other than your deductible, co-payment, or co-insu prescription right away and begin taking the medication(s) as directed.   If you believe that any of the medications or instructions on this list is different from what your Primary Care doctor has instructed you - please continue to take your medications a coverage. Patient [address] is a Federal Navigator program that can help with your Affordable Care Act coverage, as well as all types of Medicaid plans.   To get signed up and covered, please call [phone] and ask to get set up for an insuran

## (undated) NOTE — LETTER
11/16/2017      Goldy Durham MD  Physical Medicine and Rehabilitation  [address] [address]  Dept: [phone]  Dept Fax: [phone]        RE: Consultation for Donell Bosworth        Dear Cachorro Holliday MD,

## (undated) NOTE — LETTER
[facility] SURGERY SCHEDULING FORM   [address]  [address] [facility]   [phone] (scheduling phone) [phone] (scheduling fax)     PATIENT INFORMATION   Last Name:      Alf Hughes      First Name:    Haydee Ames Allergies: Patient has no known allergies.          Completed by:    Abhinav Denson      Date:    5/13/2020